# Patient Record
Sex: MALE | Race: WHITE | NOT HISPANIC OR LATINO | Employment: FULL TIME | URBAN - METROPOLITAN AREA
[De-identification: names, ages, dates, MRNs, and addresses within clinical notes are randomized per-mention and may not be internally consistent; named-entity substitution may affect disease eponyms.]

---

## 2023-10-22 PROBLEM — S39.012A STRAIN OF LUMBAR REGION: Status: ACTIVE | Noted: 2023-10-22

## 2023-10-22 PROBLEM — M62.830 BACK MUSCLE SPASM: Status: ACTIVE | Noted: 2023-10-22

## 2023-10-22 RX ORDER — MICONAZOLE NITRATE 2 %
CREAM (GRAM) TOPICAL
COMMUNITY
Start: 2017-02-21

## 2023-10-22 RX ORDER — OXYCODONE AND ACETAMINOPHEN 5; 325 MG/1; MG/1
1 TABLET ORAL EVERY 6 HOURS PRN
COMMUNITY
Start: 2023-10-10

## 2023-10-22 RX ORDER — DOCUSATE SODIUM 100 MG/1
1 CAPSULE, LIQUID FILLED ORAL 2 TIMES DAILY
COMMUNITY
Start: 2023-10-07

## 2023-10-22 RX ORDER — AMANTADINE HYDROCHLORIDE 100 MG/1
1 TABLET ORAL AS NEEDED
COMMUNITY
Start: 2017-09-19

## 2023-10-22 RX ORDER — METRONIDAZOLE 500 MG/1
1 TABLET ORAL EVERY 8 HOURS
COMMUNITY
Start: 2023-10-11

## 2023-10-22 RX ORDER — CIPROFLOXACIN 500 MG/1
1 TABLET ORAL 2 TIMES DAILY
COMMUNITY
Start: 2023-10-11

## 2023-10-24 ENCOUNTER — OFFICE VISIT (OUTPATIENT)
Dept: SURGERY | Facility: CLINIC | Age: 53
End: 2023-10-24
Payer: COMMERCIAL

## 2023-10-24 DIAGNOSIS — Z09 S/P PERI-RECTAL ABSCESS REPAIR, FOLLOW-UP EXAM: Primary | ICD-10-CM

## 2023-10-24 PROCEDURE — 99024 POSTOP FOLLOW-UP VISIT: CPT | Performed by: PHYSICIAN ASSISTANT

## 2023-10-24 NOTE — PROGRESS NOTES
Subjective   Patient ID: Valentino Loving is a 53 y.o. male who presents for Post-op (EUA w/ perirectal abscess drainage and rectal biopsy done on 10/10/23).    HPI  53-year-old gentleman status post incision and drainage of perirectal abscess on October 10, 2023.  Patient was doing well until over the weekend he went to the emergency department because he felt some pain in his rectum but he took a hot shower before he went to the ER he thinks he popped another abscess he was not sure but he had some pain he went to the ER there were no acute findings and they did a CAT scan of his pelvis and rectum and it showed that there was just a small fluid collection left no free air.  Since then the patient has been doing well without complaints no rectal drainage.  Moving his bowels well.  He is no longer having pain.    Review of Systems    Negative other than mentioned in HPI    ENT: No earache, no sore throat, no nosebleeds  Cardiovascular: No chest pain, no shortness of breath, no leg pain, no edema  Respiratory: No shortness of breath on exertion, no wheezing  Gastrointestinal: No abdominal pain, no melena, no nausea, vomiting and/or diarrhea  Musculoskeletal: No pain moving all extremities, no back pain ambulating normally  Skin: No rashes, no lesions, and no skin changes  Neuro: No headache, no confusion, no numbness and tingling  Psychiatric, normal mood, not suicidal, not homicidal, feeling good        Physical Exam  Eyes: Conjunctiva non -icteric and eye lids are without obvious rash or drooping. Pupils are symmetric.   Ears, Nose, Mouth, and Throat: External ears and nose appear to be without deformity or rash. No lesions or masses noted. Hearing is grossly intact.   Neck:. No JVD noted, tracheal position is midline. No thyromegaly, no thyroid nodules  Head and Face: Examination of the head and face revealed no abnormalities.   Respiratory: No gasping or shortness of breath noted, no use of accessory muscles  noted. Clear to auscultate bilaterally  Cardiovascular: Examination for edema is normal. Regular rate and rhythm S1 S2 without murmurs  GI: Abdomen no tender to palpation, bowel sounds present no hepatosplenomegaly  Rectum:.  Examination of the external rectum no bleeding no sign of any abscess small internal exam and no bleeding no abscess present.  No drainage.  Skin: No rashes or open lesions/ulcers identified on skin.   Musk: Digits/nails show no clubbing or cyanosis. No asymmetry or masses noted of the musculature. Examination of the muscles/joints/bones show normal range of motion. Gait is grossly normally.   Neurologic: Cranial nerves II- XII intact, motor strength 5/5 muscle strength of the lower extremities bilaterally and equal.        No diagnosis found.   Patient Active Problem List   Diagnosis    Back muscle spasm    Strain of lumbar region      Allergies   Allergen Reactions    Sulfamethoxazole-Trimethoprim Unknown      Medication Documentation Review Audit       Reviewed by Patsy Tucker MA (Medical Assistant) on 10/24/23 at 1441      Medication Order Taking? Sig Documenting Provider Last Dose Status   amantadine (Symmetrel) 100 mg tablet [id number] Yes Take 1 tablet (100 mg) by mouth if needed (up to BID for muscle spasticity). Historical MD Matilde Taking Active   ciprofloxacin (Cipro) 500 mg tablet [id number] No Take 1 tablet (500 mg) by mouth 2 times a day. Take for 7 days Tay Clayton MD Not Taking Active   docusate sodium (Colace) 100 mg capsule [id number] No Take 1 capsule (100 mg) by mouth 2 times a day. Tay Clayton MD Not Taking Active   metroNIDAZOLE (Flagyl) 500 mg tablet [id number] No 1 tablet (500 mg) every 8 hours. Take for 7 days Tay Clayton MD Not Taking Active   nicotine polacrilex (Nicorette) 2 mg gum [id number] No Take by mouth. Tay Clayton MD Not Taking Active   oxyCODONE-acetaminophen (Percocet) 5-325 mg tablet [id number] No Take 1 tablet by mouth  every 6 hours if needed (Pain). Take for 5 days Historical Provider, MD Not Taking Active                    History reviewed. No pertinent past medical history.  Social History     Tobacco Use   Smoking Status Every Day    Types: Cigarettes   Smokeless Tobacco Never     No family history on file.   History reviewed. No pertinent surgical history.    Assessment/Plan   Good hygiene is important during this period.  If you feel pain in your rectum or start experiencing any more drainage or severe pain please report to the emergency room or call the office.      Encounter Diagnosis   Name Primary?    S/P christine-rectal abscess repair, follow-up exam Yes             **Portions of this medical record have been created using voice recognition software and may have minor errors which are inherent in voice recognition systems. It has not been fully edited for typographical or grammatical errors**

## 2024-01-22 ENCOUNTER — APPOINTMENT (OUTPATIENT)
Dept: SURGERY | Facility: CLINIC | Age: 54
End: 2024-01-22
Payer: COMMERCIAL

## 2024-02-07 ENCOUNTER — APPOINTMENT (OUTPATIENT)
Dept: SURGERY | Facility: CLINIC | Age: 54
End: 2024-02-07
Payer: COMMERCIAL

## 2024-02-08 ENCOUNTER — APPOINTMENT (OUTPATIENT)
Dept: SURGERY | Facility: CLINIC | Age: 54
End: 2024-02-08
Payer: COMMERCIAL

## 2024-02-13 ENCOUNTER — OFFICE VISIT (OUTPATIENT)
Dept: SURGERY | Facility: CLINIC | Age: 54
End: 2024-02-13
Payer: COMMERCIAL

## 2024-02-13 DIAGNOSIS — K60.4 RECTAL FISTULA: Primary | ICD-10-CM

## 2024-02-13 DIAGNOSIS — R19.8 ANAL DISCHARGE: ICD-10-CM

## 2024-02-13 DIAGNOSIS — L98.8 FISTULA: ICD-10-CM

## 2024-02-13 PROCEDURE — 99215 OFFICE O/P EST HI 40 MIN: CPT | Performed by: PHYSICIAN ASSISTANT

## 2024-02-13 PROCEDURE — 46600 DIAGNOSTIC ANOSCOPY SPX: CPT | Performed by: PHYSICIAN ASSISTANT

## 2024-02-13 NOTE — PROGRESS NOTES
Subjective   Patient ID: Valentino Loving is a 53 y.o. male who presents for Follow-up (Rectal drainage s/p perirectal abscess done on 10/10/23/).    HPI  Is a 53-year-old gentleman who back on October 10, 2023 underwent exam under anesthesia for a perirectal abscess and drainage was hospitalized for 2 days postop.  Apparently a month or so after his surgery he started noticed some brown drainage in his underpants this has continued since then he has to wear a pull-up type diaper during the day because of the drainage or he wears a peripad.  He does not have any rectal pain.  States its brown and mucoid states it is never bloody.  He has not experienced any difficulty with bowel movements.  Patient's never had a colonoscopy.  Family history of lung cancer but no colon cancers    Review of Systems  Negative other than mentioned in HPI    ENT: No earache, no sore throat, no nosebleeds  Cardiovascular: No chest pain, no shortness of breath, no leg pain, no edema  Respiratory: No shortness of breath on exertion, no wheezing  Gastrointestinal: No abdominal pain, no melena, no nausea, vomiting and/or diarrhea  Rectal discharge  Musculoskeletal: No pain moving all extremities, no back pain ambulating normally  Skin: No rashes, no lesions, and no skin changes  Neuro: No headache, no confusion, no numbness and tingling  Psychiatric, normal mood, not suicidal, not homicidal, feeling good        Physical Exam  Patient ID: Valentino Loving is a 53 y.o. male.    Anoscopy    Date/Time: 2/13/2024 3:23 PM    Performed by: Jojo Bauman PA-C  Authorized by: Jojo Bauman PA-C    Consent:     Consent obtained:  Verbal    Consent given by:  Patient    Risks, benefits, and alternatives were discussed: yes      Risks discussed:  Bleeding and pain  Indications:     Indications comment:  Rectal discharge  Procedure details:     Internal hemorrhoids: yes      Internal hemorrhoid position:  One o'clock, five o'clock and three  o'clock    Inflammation: yes      Anal fissures: no      Anal fistulae: yes      Anal stricture: no      Abscess: no      Tearing: no      Blood in rectal vault: no    Post-procedure details:     Procedure completion:  Tolerated  Comments:      Patient has a fistula at the 12:00 spot posterior rectal area with a brown mucoid type discharge.  And visualization due to anoscopy I cannot see the connection into the rectum.    Eyes: Conjunctiva non -icteric and eye lids are without obvious rash or drooping. Pupils are symmetric.   Ears, Nose, Mouth, and Throat: External ears and nose appear to be without deformity or rash. No lesions or masses noted. Hearing is grossly intact.   Neck:. No JVD noted, tracheal position is midline. No thyromegaly, no thyroid nodules  Head and Face: Examination of the head and face revealed no abnormalities.   Respiratory: No gasping or shortness of breath noted, no use of accessory muscles noted.  Cardiovascular: Examination for edema is normal.   GI: Abdomen non tender to palpation, bowel sounds present no hepatosplenomegaly  Skin: No rashes or open lesions/ulcers identified on skin.   Musk: Digits/nails show no clubbing or cyanosis. No asymmetry or masses noted of the musculature. Examination of the muscles/joints/bones show normal range of motion. Gait is grossly normally.   Neurologic: Cranial nerves II- XII intact, motor strength 5/5 muscle strength of the lower extremities bilaterally and equal.      Allergies   Allergen Reactions    Sulfamethoxazole-Trimethoprim Unknown      Medication Documentation Review Audit       Reviewed by Patsy Tucker MA (Medical Assistant) on 02/13/24 at 1418      Medication Order Taking? Sig Documenting Provider Last Dose Status   amantadine (Symmetrel) 100 mg tablet [id number] No Take 1 tablet (100 mg) by mouth if needed (up to BID for muscle spasticity). Historical Provider, MD Not Taking Active   ciprofloxacin (Cipro) 500 mg tablet [id number] No Take 1  tablet (500 mg) by mouth 2 times a day. Take for 7 days Historical Provider, MD Not Taking Active   docusate sodium (Colace) 100 mg capsule [id number] No Take 1 capsule (100 mg) by mouth 2 times a day. Historical Provider, MD Not Taking Active   metroNIDAZOLE (Flagyl) 500 mg tablet [id number] No 1 tablet (500 mg) every 8 hours. Take for 7 days Tay Provider, MD Not Taking Active   nicotine polacrilex (Nicorette) 2 mg gum [id number] No Take by mouth. Historical Provider, MD Not Taking Active   oxyCODONE-acetaminophen (Percocet) 5-325 mg tablet [id number] No Take 1 tablet by mouth every 6 hours if needed (Pain). Take for 5 days Historical Provider, MD Not Taking Active                  Objective     No diagnosis found.   Patient Active Problem List   Diagnosis    Back muscle spasm    Strain of lumbar region            History reviewed. No pertinent past medical history.  Social History     Tobacco Use   Smoking Status Every Day    Types: Cigarettes   Smokeless Tobacco Never     No family history on file.   History reviewed. No pertinent surgical history.    Assessment/Plan   Today had a discussion with the patient he most likely has a rectal fistula.  I cannot see the opening with the anoscopy.  But it is definitely a hole that leads into the rectum from the outside.  Patient was instructed he will need an exam under anesthesia.  Patient has never had a colonoscopy so colonoscopy can be done at the same time as well and a possible seton placement before all this occurs patient should undergo a CAT scan of the pelvis with IV and oral contrast to evaluate for the fistula.  Patient provided with that.    Today we had a discussion about colonoscopy.  Patient was informed this is done as an outpatient surgery.  It takes around 30 minutes.  Patient will require a colon prep.  It is done under monitored anesthesia care.  Alternatives to procedure were discussed.  All questions answered risk and benefits were discussed.   Patient had complete understanding.  Patient would like to proceed.   Procedure for seton placement was discussed in detail with the patient patient was instructed to the red rubber band that goes in 1 and of the hole and comes out the other is tied off and then is tightened every couple of weeks in the office.  Infection and bleeding discussed with patient.  Risk and benefits discussed.  Patient would like to proceed.      Encounter Diagnoses   Name Primary?    Fistula     Rectal fistula Yes    Anal discharge      I have reviewed all data including labs,radiologic and previous reports.        **Portions of this medical record have been created using voice recognition software and may have minor errors which are inherent in voice recognition systems. It has not been fully edited for typographical or grammatical errors**

## 2024-02-20 DIAGNOSIS — K60.4 RECTAL FISTULA: ICD-10-CM

## 2024-02-21 RX ORDER — METRONIDAZOLE 500 MG/1
500 TABLET ORAL 3 TIMES DAILY
Qty: 21 TABLET | Refills: 0 | Status: SHIPPED | OUTPATIENT
Start: 2024-02-21 | End: 2024-02-28

## 2024-03-21 ENCOUNTER — OFFICE VISIT (OUTPATIENT)
Dept: SURGERY | Facility: CLINIC | Age: 54
End: 2024-03-21
Payer: COMMERCIAL

## 2024-03-21 ENCOUNTER — HOSPITAL ENCOUNTER (OUTPATIENT)
Dept: RADIOLOGY | Facility: EXTERNAL LOCATION | Age: 54
Discharge: HOME | End: 2024-03-21

## 2024-03-21 VITALS — WEIGHT: 165 LBS | HEIGHT: 72 IN | BODY MASS INDEX: 22.35 KG/M2

## 2024-03-21 DIAGNOSIS — K57.92 DIVERTICULITIS: ICD-10-CM

## 2024-03-21 PROCEDURE — 99024 POSTOP FOLLOW-UP VISIT: CPT | Performed by: SURGERY

## 2024-03-21 ASSESSMENT — PATIENT HEALTH QUESTIONNAIRE - PHQ9
2. FEELING DOWN, DEPRESSED OR HOPELESS: NOT AT ALL
SUM OF ALL RESPONSES TO PHQ9 QUESTIONS 1 AND 2: 0
1. LITTLE INTEREST OR PLEASURE IN DOING THINGS: NOT AT ALL

## 2024-03-21 ASSESSMENT — PAIN SCALES - GENERAL: PAINLEVEL: 0-NO PAIN

## 2024-03-21 NOTE — LETTER
March 21, 2024     Patient: Valentino Loving   YOB: 1970   Date of Visit: 3/21/2024       To Whom It May Concern:    It is my medical opinion that Valentino Loving may return to work on 3/25/2024 .    If you have any questions or concerns, please don't hesitate to call.         Sincerely,        Lázaro Lovelace MD    CC: No Recipients

## 2024-03-21 NOTE — PROGRESS NOTES
"Subjective   Patient ID: Valentino Loving \"Efren\" is a 53 y.o. male who presents for seton tightening (Pt here for seton tightening, pt states he is wondering if he has a chronic infection that is effecting is rectal area, pt states he was on 5 abx total recently. Pt states pcp has given cipro and flagyl on currently).    HPI patient with a history of rectal fistula.  Colonoscopy showed also active proctosigmoiditis.  Patient presented for tightening.  Review of Systems is additionally consistent with chills and fevers, improved with oral antibiotics.  Review of other 10 system is negative  Physical Exam pupils equal bilaterally, oral mucosa moist, bilateral breath sounds, regular rhythm and rate, abdomen soft, nontender, palpable peripheral pulse, no focal neurological motor deficits.  On the rectal exam there is no evidence of infection.  We proceeded with a tightening of seton.  It was done without complications    Objective   I reviewed all available data including lab results, radiological studies, previous reports and notes.  Colonoscopy showed internal hemorrhoids, inflammatory changes in the rectum and sigmoid colon.  Pseudopolyps.  Multiple biopsies were taken.  Biopsies were consistent with chronic inactive colitis and focal granulomatous inflammation.  CT scan of abdomen and pelvis was done, showed no acute abnormality of the pelvis.  Left upper rectal abscess no longer visualized.  No diagnosis found.   Patient Active Problem List   Diagnosis    Back muscle spasm    Strain of lumbar region      Allergies   Allergen Reactions    Sulfamethoxazole-Trimethoprim Unknown      Medication Documentation Review Audit       Reviewed by Kevin Lee MA (Medical Assistant) on 03/21/24 at 1019      Medication Order Taking? Sig Documenting Provider Last Dose Status   amantadine (Symmetrel) 100 mg tablet [id number]  Take 1 tablet (100 mg) by mouth if needed (up to BID for muscle spasticity). Historical Provider, MD  " Active   ciprofloxacin (Cipro) 500 mg tablet [id number]  Take 1 tablet (500 mg) by mouth 2 times a day. Take for 7 days Historical Provider, MD  Active   docusate sodium (Colace) 100 mg capsule [id number]  Take 1 capsule (100 mg) by mouth 2 times a day. Historical Provider, MD  Active   metroNIDAZOLE (Flagyl) 500 mg tablet [id number]  1 tablet (500 mg) every 8 hours. Take for 7 days Historical Provider, MD  Active   nicotine polacrilex (Nicorette) 2 mg gum [id number]  Take by mouth. Historical Provider, MD  Active   oxyCODONE-acetaminophen (Percocet) 5-325 mg tablet [id number]  Take 1 tablet by mouth every 6 hours if needed (Pain). Take for 5 days Historical Provider, MD  Active                    No past medical history on file.  Social History     Tobacco Use   Smoking Status Every Day    Types: Cigarettes   Smokeless Tobacco Never     No family history on file.   Past Surgical History:   Procedure Laterality Date    ANAL EXAMINATION UNDER ANESTHESIA      INCISION AND DRAINAGE PERIRECTAL ABSCESS         Assessment/Plan   The patient with rectal fistula.  Successful tightening of the seton.  The pathology of the biopsy from the colon show active colitis, therefore patient will be referred to gastroenterology, Dr. Vollweiller.  We will repeat CT scan of the pelvis to rule out possibility of recurrence of perirectal abscess because of chills and fevers.  Follow-up in 2 weeks for seton tightening.  Patient will need relief from work because of significant pain after tightening for next 3 to 4 days      Lázaro Lovelace MD

## 2024-04-04 ENCOUNTER — OFFICE VISIT (OUTPATIENT)
Dept: SURGERY | Facility: CLINIC | Age: 54
End: 2024-04-04
Payer: COMMERCIAL

## 2024-04-04 VITALS
DIASTOLIC BLOOD PRESSURE: 72 MMHG | WEIGHT: 160 LBS | HEIGHT: 72 IN | BODY MASS INDEX: 21.67 KG/M2 | SYSTOLIC BLOOD PRESSURE: 130 MMHG

## 2024-04-04 DIAGNOSIS — K60.4 RECTAL FISTULA: Primary | ICD-10-CM

## 2024-04-04 PROCEDURE — 99024 POSTOP FOLLOW-UP VISIT: CPT | Performed by: SURGERY

## 2024-04-04 ASSESSMENT — PATIENT HEALTH QUESTIONNAIRE - PHQ9
SUM OF ALL RESPONSES TO PHQ9 QUESTIONS 1 AND 2: 0
2. FEELING DOWN, DEPRESSED OR HOPELESS: NOT AT ALL
1. LITTLE INTEREST OR PLEASURE IN DOING THINGS: NOT AT ALL

## 2024-04-04 ASSESSMENT — PAIN SCALES - GENERAL: PAINLEVEL: 0-NO PAIN

## 2024-04-04 NOTE — PROGRESS NOTES
"Subjective   Patient ID: Valentino Loving \"Efren\" is a 53 y.o. male who presents for Follow-up (Seton follow up, pt states about 4-5 days after seton tightening, it \"fell out\", pt states he was told by infectious disease that it was normal. Otherwise pt OK ).  The patient has no complaints    HPI the patient with a history of rectal fistula, seton placement.  Seton time pain.  Review of Systems review of all 14 system is negative  Physical Exam pupils equal bilaterally, oral mucosa moist, bilateral breath sounds, regular rate, abdomen soft, nontender, palpable peripheral pulse, no focal neurological motor deficits.  On the rectal exam there was completely under the fistula.  No evidence of seton    Objective     No diagnosis found.   Patient Active Problem List   Diagnosis    Back muscle spasm    Strain of lumbar region      Allergies   Allergen Reactions    Sulfamethoxazole-Trimethoprim Unknown      Medication Documentation Review Audit       Reviewed by Kevin Lee MA (Medical Assistant) on 04/04/24 at 1120      Medication Order Taking? Sig Documenting Provider Last Dose Status   amantadine (Symmetrel) 100 mg tablet [id number] No Take 1 tablet (100 mg) by mouth if needed (up to BID for muscle spasticity). Historical Provider, MD Taking Active   ciprofloxacin (Cipro) 500 mg tablet [id number] No Take 1 tablet (500 mg) by mouth 2 times a day. Take for 7 days Historical MD Matilde Taking Active   docusate sodium (Colace) 100 mg capsule [id number] No Take 1 capsule (100 mg) by mouth 2 times a day. Historical Provider, MD Taking Active   metroNIDAZOLE (Flagyl) 500 mg tablet [id number] No 1 tablet (500 mg) every 8 hours. Take for 7 days Tay Clayton MD Taking Active   nicotine polacrilex (Nicorette) 2 mg gum [id number] No Take by mouth. Historical Provider, MD Taking Active   oxyCODONE-acetaminophen (Percocet) 5-325 mg tablet [id number] No Take 1 tablet by mouth every 6 hours if needed (Pain). Take for 5 days " Historical Provider, MD Taking Active                    No past medical history on file.  Social History     Tobacco Use   Smoking Status Every Day    Types: Cigarettes   Smokeless Tobacco Never     No family history on file.   Past Surgical History:   Procedure Laterality Date    ANAL EXAMINATION UNDER ANESTHESIA      INCISION AND DRAINAGE PERIRECTAL ABSCESS         Assessment/Plan   The patient with rectal fistula, status post seton placement.  Complete healing.  Seton fell out.  Patient has no complaints.  No further assessment is indicated.  Follow-up as needed      Lázaro Lovelace MD

## 2024-12-09 ENCOUNTER — APPOINTMENT (OUTPATIENT)
Dept: SURGERY | Facility: CLINIC | Age: 54
End: 2024-12-09
Payer: COMMERCIAL